# Patient Record
Sex: MALE | Race: WHITE | NOT HISPANIC OR LATINO
[De-identification: names, ages, dates, MRNs, and addresses within clinical notes are randomized per-mention and may not be internally consistent; named-entity substitution may affect disease eponyms.]

---

## 2020-01-20 ENCOUNTER — APPOINTMENT (OUTPATIENT)
Dept: PULMONOLOGY | Facility: CLINIC | Age: 60
End: 2020-01-20
Payer: COMMERCIAL

## 2020-01-20 VITALS
SYSTOLIC BLOOD PRESSURE: 121 MMHG | BODY MASS INDEX: 25.27 KG/M2 | WEIGHT: 161 LBS | HEIGHT: 67 IN | DIASTOLIC BLOOD PRESSURE: 80 MMHG | RESPIRATION RATE: 15 BRPM | HEART RATE: 89 BPM | OXYGEN SATURATION: 98 %

## 2020-01-20 DIAGNOSIS — E78.5 HYPERLIPIDEMIA, UNSPECIFIED: ICD-10-CM

## 2020-01-20 DIAGNOSIS — F17.200 NICOTINE DEPENDENCE, UNSPECIFIED, UNCOMPLICATED: ICD-10-CM

## 2020-01-20 DIAGNOSIS — Z78.9 OTHER SPECIFIED HEALTH STATUS: ICD-10-CM

## 2020-01-20 PROBLEM — Z00.00 ENCOUNTER FOR PREVENTIVE HEALTH EXAMINATION: Status: ACTIVE | Noted: 2020-01-20

## 2020-01-20 PROCEDURE — 94060 EVALUATION OF WHEEZING: CPT

## 2020-01-20 PROCEDURE — 99204 OFFICE O/P NEW MOD 45 MIN: CPT | Mod: 25

## 2020-01-20 PROCEDURE — 99406 BEHAV CHNG SMOKING 3-10 MIN: CPT

## 2020-01-20 PROCEDURE — 94664 DEMO&/EVAL PT USE INHALER: CPT | Mod: 59

## 2020-01-20 PROCEDURE — 94750: CPT

## 2020-01-20 PROCEDURE — 94726 PLETHYSMOGRAPHY LUNG VOLUMES: CPT

## 2020-01-20 PROCEDURE — 94729 DIFFUSING CAPACITY: CPT

## 2020-01-20 NOTE — PHYSICAL EXAM
[General Appearance - Well Developed] : well developed [Normal Oropharynx] : normal oropharynx [Normal Conjunctiva] : the conjunctiva exhibited no abnormalities [General Appearance - Well Nourished] : well nourished [Jugular Venous Distention Increased] : there was no jugular-venous distention [Heart Sounds] : normal S1 and S2 [Murmurs] : no murmurs present [Edema] : no peripheral edema present [Auscultation Breath Sounds / Voice Sounds] : lungs were clear to auscultation bilaterally [Lungs Percussion] : the lungs were normal to percussion [Abdomen Soft] : soft [Abdomen Tenderness] : non-tender [] : no hepato-splenomegaly [Abdomen Mass (___ Cm)] : no abdominal mass palpated [Abnormal Walk] : normal gait [Gait - Sufficient For Exercise Testing] : the gait was sufficient for exercise testing [Nail Clubbing] : no clubbing of the fingernails [Cyanosis, Localized] : no localized cyanosis

## 2020-01-21 NOTE — ASSESSMENT
[FreeTextEntry1] : Course abx then PET imaging. \par Decision on bx vs. resection vs. observation based on above. \par Trial Chantix\par F/U post PET imaging\par 5-7 minute discussion with patient about smoking cessation was initiated with patient showing interest in attempting to quit smoking. Problems with risks of continued tobacco smoking including respiratory, cardiovascular, and oncological problems were discussed. Advice on smoking cessation was reiterated including methods of quitting, setting a date to quit, and different medicines and support systems available for smoking cessation was discussed. \par \par \par

## 2020-01-21 NOTE — CONSULT LETTER
[Dear  ___] : Dear ~ELIZABETH, [Consult Letter:] : I had the pleasure of evaluating your patient, [unfilled]. [Please see my note below.] : Please see my note below. [Consult Closing:] : Thank you very much for allowing me to participate in the care of this patient.  If you have any questions, please do not hesitate to contact me. [Sincerely,] : Sincerely, [FreeTextEntry2] : Desean Busch MD [FreeTextEntry3] : Gerardo Saab MD FCCP\par

## 2020-01-21 NOTE — HISTORY OF PRESENT ILLNESS
[FreeTextEntry1] : CHERELLE SILVEIRA is a 59 year old  M referred for pulmonary evaluation for abnormal CT\par \par Went to PMD sent for CT Lung cancer screening. \par \par To see cardio\par \par Mild HILL. \par \par Past pulmonary history. 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 N\par Recent travel  5/19 [facility].\par Pets N\par \par

## 2020-01-21 NOTE — PROCEDURE
[FreeTextEntry1] : 01/20/2020\par Pulmonary function testing\par Normal Flow Rates There is no significant bronchodilator response. There is elevation in the RV/TLC ratio indicative of possible air trapping. There is a mild diffusion impairment. Airway resistance is normal. \par \par CT reviewed. 1/13/20

## 2020-01-21 NOTE — DISCUSSION/SUMMARY
[FreeTextEntry1] : LLL lesion of concern for possible neoplastic process. Lesion solid, irregular. ? pleural tag.   Infectious inflammatory cannot be excluded. \par COPD\par RUL abnormality likely inflammatory.

## 2020-02-05 ENCOUNTER — APPOINTMENT (OUTPATIENT)
Dept: PULMONOLOGY | Facility: CLINIC | Age: 60
End: 2020-02-05
Payer: COMMERCIAL

## 2020-02-05 VITALS
DIASTOLIC BLOOD PRESSURE: 80 MMHG | RESPIRATION RATE: 14 BRPM | HEART RATE: 83 BPM | HEIGHT: 67 IN | OXYGEN SATURATION: 100 % | SYSTOLIC BLOOD PRESSURE: 120 MMHG

## 2020-02-05 DIAGNOSIS — I25.10 ATHEROSCLEROTIC HEART DISEASE OF NATIVE CORONARY ARTERY W/OUT ANGINA PECTORIS: ICD-10-CM

## 2020-02-05 PROCEDURE — 99214 OFFICE O/P EST MOD 30 MIN: CPT

## 2020-02-06 PROBLEM — I25.10 CORONARY ATHEROSCLEROSIS: Status: ACTIVE | Noted: 2020-02-06

## 2020-02-06 NOTE — HISTORY OF PRESENT ILLNESS
[TextBox_4] : Feeling well\par Seeing cardio\par Doing well with D/C smoking\par No significant cough, wheezing, chest pain or SOB.\par  [FreeTextEntry1] : CHERELLE SILVEIRA is a 59 year old  M referred for pulmonary evaluation for abnormal CT\par \par Went to PMD sent for CT Lung cancer screening. \par \par To see cardio\par \par Mild HILL. \par \par Past pulmonary history. 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 N\par Recent travel  5/19 [facility].\par Pets N\par \par

## 2020-02-06 NOTE — PROCEDURE
[FreeTextEntry1] : 01/20/2020\par Pulmonary function testing\par Normal Flow Rates There is no significant bronchodilator response. There is elevation in the RV/TLC ratio indicative of possible air trapping. There is a mild diffusion impairment. Airway resistance is normal. \par \par CT reviewed. 1/13/20\par \par PET reviewed 2/3/20

## 2020-02-06 NOTE — CONSULT LETTER
[Dear  ___] : Dear ~ELIZABETH, [Please see my note below.] : Please see my note below. [Consult Letter:] : I had the pleasure of evaluating your patient, [unfilled]. [Consult Closing:] : Thank you very much for allowing me to participate in the care of this patient.  If you have any questions, please do not hesitate to contact me. [Sincerely,] : Sincerely, [FreeTextEntry2] : Desean Busch MD [FreeTextEntry3] : Gerardo Saab MD FCCP\par

## 2020-02-06 NOTE — ASSESSMENT
[FreeTextEntry1] : Due to the recent radiographic change I think it is in his best interest to repeat a CAT scan of the chest in 6-8 week to reassess both the right lower lobe and new left upper lobe nodule prior to making a decision regarding need for resection or biopsy of the left lower lobe lesion.\par \par Patient is not a lot I have held off on further antibiotic therapy at this time however should he develop chest symptoms we'll give a course of antibiotic.\par \par I had a lengthy discussion with patient and wife regarding the radiographic findings as well as recommendations

## 2020-02-06 NOTE — DISCUSSION/SUMMARY
[FreeTextEntry1] : LLL lesion remains of concern for possible neoplastic process. Lesion solid, irregular. ? pleural tag.   \par COPD  SUV 1.9\par New GAUTAM nodule not present on recent CT\par RUL abnormality likely inflammatory. \par RLL infiltrate significantly hypermetabolic and increased in size likely infectious inflammatory in origin.'\par Degree of growth in short interval would be very atypical for neoplastic process.

## 2020-03-09 ENCOUNTER — OUTPATIENT (OUTPATIENT)
Dept: OUTPATIENT SERVICES | Facility: HOSPITAL | Age: 60
LOS: 1 days | Discharge: HOME | End: 2020-03-09
Payer: COMMERCIAL

## 2020-03-09 DIAGNOSIS — D68.0 VON WILLEBRAND DISEASE: ICD-10-CM

## 2020-03-10 ENCOUNTER — RESULT REVIEW (OUTPATIENT)
Age: 60
End: 2020-03-10

## 2020-03-10 ENCOUNTER — APPOINTMENT (OUTPATIENT)
Dept: HEMATOLOGY ONCOLOGY | Facility: CLINIC | Age: 60
End: 2020-03-10
Payer: COMMERCIAL

## 2020-03-10 VITALS
RESPIRATION RATE: 16 BRPM | WEIGHT: 163.34 LBS | SYSTOLIC BLOOD PRESSURE: 122 MMHG | DIASTOLIC BLOOD PRESSURE: 77 MMHG | TEMPERATURE: 98.4 F | OXYGEN SATURATION: 99 % | HEART RATE: 83 BPM | BODY MASS INDEX: 25.04 KG/M2 | HEIGHT: 67.72 IN

## 2020-03-10 DIAGNOSIS — Z80.8 FAMILY HISTORY OF MALIGNANT NEOPLASM OF OTHER ORGANS OR SYSTEMS: ICD-10-CM

## 2020-03-10 DIAGNOSIS — Z78.9 OTHER SPECIFIED HEALTH STATUS: ICD-10-CM

## 2020-03-10 LAB
APTT BLD: 36.4 SEC
BASOPHILS # BLD AUTO: 0.03 K/UL — SIGNIFICANT CHANGE UP (ref 0–0.2)
BASOPHILS NFR BLD AUTO: 0.6 % — SIGNIFICANT CHANGE UP (ref 0–2)
EOSINOPHIL # BLD AUTO: 0.22 K/UL — SIGNIFICANT CHANGE UP (ref 0–0.5)
EOSINOPHIL NFR BLD AUTO: 4.5 % — SIGNIFICANT CHANGE UP (ref 0–6)
HCT VFR BLD CALC: 43 % — SIGNIFICANT CHANGE UP (ref 39–50)
HGB BLD-MCNC: 14.9 G/DL — SIGNIFICANT CHANGE UP (ref 13–17)
IMM GRANULOCYTES NFR BLD AUTO: 0.4 % — SIGNIFICANT CHANGE UP (ref 0–1.5)
LYMPHOCYTES # BLD AUTO: 1.6 K/UL — SIGNIFICANT CHANGE UP (ref 1–3.3)
LYMPHOCYTES # BLD AUTO: 32.9 % — SIGNIFICANT CHANGE UP (ref 13–44)
MCHC RBC-ENTMCNC: 32.3 PG — SIGNIFICANT CHANGE UP (ref 27–34)
MCHC RBC-ENTMCNC: 34.7 GM/DL — SIGNIFICANT CHANGE UP (ref 32–36)
MCV RBC AUTO: 93.1 FL — SIGNIFICANT CHANGE UP (ref 80–100)
MONOCYTES # BLD AUTO: 0.53 K/UL — SIGNIFICANT CHANGE UP (ref 0–0.9)
MONOCYTES NFR BLD AUTO: 10.9 % — SIGNIFICANT CHANGE UP (ref 2–14)
NEUTROPHILS # BLD AUTO: 2.47 K/UL — SIGNIFICANT CHANGE UP (ref 1.8–7.4)
NEUTROPHILS NFR BLD AUTO: 50.7 % — SIGNIFICANT CHANGE UP (ref 43–77)
NRBC # BLD: 0 /100 WBCS — SIGNIFICANT CHANGE UP (ref 0–0)
PLATELET # BLD AUTO: 235 K/UL — SIGNIFICANT CHANGE UP (ref 150–400)
RBC # BLD: 4.62 M/UL — SIGNIFICANT CHANGE UP (ref 4.2–5.8)
RBC # FLD: 12.4 % — SIGNIFICANT CHANGE UP (ref 10.3–14.5)
WBC # BLD: 4.87 K/UL — SIGNIFICANT CHANGE UP (ref 3.8–10.5)
WBC # FLD AUTO: 4.87 K/UL — SIGNIFICANT CHANGE UP (ref 3.8–10.5)

## 2020-03-10 PROCEDURE — 99205 OFFICE O/P NEW HI 60 MIN: CPT

## 2020-03-10 RX ORDER — AZITHROMYCIN 250 MG/1
250 TABLET, FILM COATED ORAL
Qty: 10 | Refills: 0 | Status: DISCONTINUED | COMMUNITY
Start: 2020-01-20 | End: 2020-03-10

## 2020-03-10 RX ORDER — AMOXICILLIN AND CLAVULANATE POTASSIUM 875; 125 MG/1; MG/1
875-125 TABLET, COATED ORAL
Qty: 20 | Refills: 0 | Status: DISCONTINUED | COMMUNITY
Start: 2020-02-21 | End: 2020-03-10

## 2020-03-10 RX ORDER — VARENICLINE TARTRATE 0.5 (11)-1
0.5 MG X 11 & KIT ORAL
Qty: 1 | Refills: 0 | Status: DISCONTINUED | COMMUNITY
Start: 2020-01-20 | End: 2020-03-10

## 2020-03-10 RX ORDER — ATORVASTATIN CALCIUM 20 MG/1
20 TABLET, FILM COATED ORAL
Refills: 0 | Status: ACTIVE | COMMUNITY
Start: 2020-01-09

## 2020-03-10 NOTE — CONSULT LETTER
[Dear  ___] : Dear  [unfilled], [Consult Letter:] : I had the pleasure of evaluating your patient, [unfilled]. [Please see my note below.] : Please see my note below. [Consult Closing:] : Thank you very much for allowing me to participate in the care of this patient.  If you have any questions, please do not hesitate to contact me. [Sincerely,] : Sincerely, [DrClaudia  ___] : Dr. ORTIZ [DrClaudia ___] : Dr. ORTIZ
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
## 2020-03-10 NOTE — ASSESSMENT
[FreeTextEntry1] : 59 yo M with FHx von Willebrand type 1 disease, here for hematological clearance prior to lung wedge resection surgery. No hx of bleeding\par \par -will repeat von Willebrand antigen/activity/factor VIII level and aPTT today. Patient has type O blood, which is associated with a lower baseline level of von Willebrand, which can play a role in this patient, flakita. no bleeding\par \par -plan to do DDAVP trial to see if pt responds -scheduled on 3/13/20, will draw CBC, factor VIII, von Willebrand Ab/Activity and aPTT prior to infusion of DDAVP 0.3mcg/kg (to max 20mcg), then repeat testing at 1 hr and 4 hours post administration, to ensure patient responds. If he is a responder, then DDAVP is a possibility prior to surgery.  Will d/w thoracic surgeon the extent of surgery -if major surgery, then he may benefit from vWF concentrates to keep levels of vWF to 100%. Usual dosing is Humate-P 50 ristocetic cofactor units/kg (=3700 units) followed by 20-40 RCU/kg (=3929-3486 Unit) q12 hours for 5-7 days, as clinically indicated\par \par -d/w pt and wife risks/benefits of DDAVP and written consent was signed today\par \par -follow up after surgery; will d/w pt and family results and recommendations after testing

## 2020-03-10 NOTE — RESULTS/DATA
[FreeTextEntry1] : Today's CBC (ON 3/10/20) wbc 4.9 hb 14.9 plt 235\par \par The previous medical records were reviewed\par On 2/1/20 factor XI activity 62, factor VIII 26%, von WF 52%, activity 39%, multimers normal, blood type O+\par

## 2020-03-10 NOTE — PHYSICAL EXAM
Abdominal Pain, N/V/D [Fully active, able to carry on all pre-disease performance without restriction] : Status 0 - Fully active, able to carry on all pre-disease performance without restriction [Normal] : affect appropriate

## 2020-03-10 NOTE — REVIEW OF SYSTEMS
[Negative] : Allergic/Immunologic [Fever] : no fever [Chills] : no chills [Night Sweats] : no night sweats [Fatigue] : no fatigue [Recent Change In Weight] : ~T no recent weight change [Dysphagia] : no dysphagia [Loss of Hearing] : no loss of hearing [Nosebleeds] : no nosebleeds [Odynophagia] : no odynophagia [Mucosal Pain] : no mucosal pain [Chest Pain] : no chest pain [Shortness Of Breath] : no shortness of breath [Wheezing] : no wheezing [Cough] : no cough [SOB on Exertion] : no shortness of breath during exertion [Abdominal Pain] : no abdominal pain [Dysuria] : no dysuria [Incontinence] : no incontinence [Joint Pain] : no joint pain [Proptosis] : no proptosis [Hot Flashes] : no hot flashes [Muscle Weakness] : no muscle weakness [Easy Bleeding] : no tendency for easy bleeding [Easy Bruising] : no tendency for easy bruising [Swollen Glands] : no swollen glands

## 2020-03-11 LAB
FACT VIII ACT/NOR PPP: 72 %
VWF AG PPP IA-ACNC: 110 %
VWF:RCO ACT/NOR PPP PL AGG: 124 %

## 2020-03-13 ENCOUNTER — LABORATORY RESULT (OUTPATIENT)
Age: 60
End: 2020-03-13

## 2020-03-13 ENCOUNTER — RESULT REVIEW (OUTPATIENT)
Age: 60
End: 2020-03-13

## 2020-03-13 ENCOUNTER — APPOINTMENT (OUTPATIENT)
Dept: INFUSION THERAPY | Facility: HOSPITAL | Age: 60
End: 2020-03-13

## 2020-03-13 LAB
BASOPHILS # BLD AUTO: 0.05 K/UL — SIGNIFICANT CHANGE UP (ref 0–0.2)
BASOPHILS NFR BLD AUTO: 0.8 % — SIGNIFICANT CHANGE UP (ref 0–2)
EOSINOPHIL # BLD AUTO: 0.28 K/UL — SIGNIFICANT CHANGE UP (ref 0–0.5)
EOSINOPHIL NFR BLD AUTO: 4.4 % — SIGNIFICANT CHANGE UP (ref 0–6)
HCT VFR BLD CALC: 41.4 % — SIGNIFICANT CHANGE UP (ref 39–50)
HGB BLD-MCNC: 14.5 G/DL — SIGNIFICANT CHANGE UP (ref 13–17)
IMM GRANULOCYTES NFR BLD AUTO: 0.8 % — SIGNIFICANT CHANGE UP (ref 0–1.5)
LYMPHOCYTES # BLD AUTO: 2.01 K/UL — SIGNIFICANT CHANGE UP (ref 1–3.3)
LYMPHOCYTES # BLD AUTO: 31.6 % — SIGNIFICANT CHANGE UP (ref 13–44)
MCHC RBC-ENTMCNC: 32.6 PG — SIGNIFICANT CHANGE UP (ref 27–34)
MCHC RBC-ENTMCNC: 35 GM/DL — SIGNIFICANT CHANGE UP (ref 32–36)
MCV RBC AUTO: 93 FL — SIGNIFICANT CHANGE UP (ref 80–100)
MONOCYTES # BLD AUTO: 0.54 K/UL — SIGNIFICANT CHANGE UP (ref 0–0.9)
MONOCYTES NFR BLD AUTO: 8.5 % — SIGNIFICANT CHANGE UP (ref 2–14)
NEUTROPHILS # BLD AUTO: 3.43 K/UL — SIGNIFICANT CHANGE UP (ref 1.8–7.4)
NEUTROPHILS NFR BLD AUTO: 53.9 % — SIGNIFICANT CHANGE UP (ref 43–77)
NRBC # BLD: 0 /100 WBCS — SIGNIFICANT CHANGE UP (ref 0–0)
PLATELET # BLD AUTO: 241 K/UL — SIGNIFICANT CHANGE UP (ref 150–400)
RBC # BLD: 4.45 M/UL — SIGNIFICANT CHANGE UP (ref 4.2–5.8)
RBC # FLD: 12.1 % — SIGNIFICANT CHANGE UP (ref 10.3–14.5)
WBC # BLD: 6.36 K/UL — SIGNIFICANT CHANGE UP (ref 3.8–10.5)
WBC # FLD AUTO: 6.36 K/UL — SIGNIFICANT CHANGE UP (ref 3.8–10.5)

## 2020-03-13 PROCEDURE — 93010 ELECTROCARDIOGRAM REPORT: CPT

## 2020-03-16 ENCOUNTER — APPOINTMENT (OUTPATIENT)
Dept: PULMONOLOGY | Facility: CLINIC | Age: 60
End: 2020-03-16

## 2020-06-08 ENCOUNTER — APPOINTMENT (OUTPATIENT)
Dept: HEMATOLOGY ONCOLOGY | Facility: CLINIC | Age: 60
End: 2020-06-08

## 2020-08-11 DIAGNOSIS — R91.8 OTHER NONSPECIFIC ABNORMAL FINDING OF LUNG FIELD: ICD-10-CM

## 2021-02-22 ENCOUNTER — NON-APPOINTMENT (OUTPATIENT)
Age: 61
End: 2021-02-22

## 2021-09-13 DIAGNOSIS — Z20.822 CONTACT WITH AND (SUSPECTED) EXPOSURE TO COVID-19: ICD-10-CM

## 2021-09-13 PROBLEM — D68.0 VON WILLEBRAND DISEASE: Chronic | Status: ACTIVE | Noted: 2020-03-12

## 2021-09-13 PROBLEM — J44.9 CHRONIC OBSTRUCTIVE PULMONARY DISEASE, UNSPECIFIED: Chronic | Status: ACTIVE | Noted: 2020-03-12

## 2021-09-13 PROBLEM — I25.10 ATHEROSCLEROTIC HEART DISEASE OF NATIVE CORONARY ARTERY WITHOUT ANGINA PECTORIS: Chronic | Status: ACTIVE | Noted: 2020-03-12

## 2021-09-27 ENCOUNTER — APPOINTMENT (OUTPATIENT)
Dept: OTOLARYNGOLOGY | Facility: CLINIC | Age: 61
End: 2021-09-27
Payer: COMMERCIAL

## 2021-09-27 VITALS
WEIGHT: 187 LBS | BODY MASS INDEX: 29.35 KG/M2 | DIASTOLIC BLOOD PRESSURE: 74 MMHG | HEIGHT: 67 IN | SYSTOLIC BLOOD PRESSURE: 111 MMHG | HEART RATE: 80 BPM

## 2021-09-27 DIAGNOSIS — H93.13 TINNITUS, BILATERAL: ICD-10-CM

## 2021-09-27 DIAGNOSIS — H83.3X3 NOISE EFFECTS ON INNER EAR, BILATERAL: ICD-10-CM

## 2021-09-27 DIAGNOSIS — H91.90 UNSPECIFIED HEARING LOSS, UNSPECIFIED EAR: ICD-10-CM

## 2021-09-27 DIAGNOSIS — H91.93 UNSPECIFIED HEARING LOSS, BILATERAL: ICD-10-CM

## 2021-09-27 DIAGNOSIS — J34.2 DEVIATED NASAL SEPTUM: ICD-10-CM

## 2021-09-27 PROCEDURE — 99243 OFF/OP CNSLTJ NEW/EST LOW 30: CPT

## 2021-09-27 PROCEDURE — 92557 COMPREHENSIVE HEARING TEST: CPT

## 2021-09-27 PROCEDURE — 92588 EVOKED AUDITORY TST COMPLETE: CPT

## 2021-09-27 PROCEDURE — 92570 ACOUSTIC IMMITANCE TESTING: CPT
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
## 2021-09-27 NOTE — REVIEW OF SYSTEMS
[Hearing Loss] : hearing loss [Ear Noises] : ear noises [Throat Clearing] : throat clearing [Heartburn] : heartburn [Joint Pain] : joint pain [Negative] : Heme/Lymph [FreeTextEntry9] : muscle aches

## 2021-09-27 NOTE — DATA REVIEWED
[de-identified] : \par - type A tymps AU\par - ETF WNL AD; abnormal AS\par - AD: hearing WNL precipitously sloping to a profound SNHL 250-8000 Hz\par - AS: hearing WNL precipitously sloping to a severe SNHL 250-8000 Hz\par - DPOAEs: present 1-2kHz AU\par 1) ENT F/U 2) Re-eval as per MD 3) HAE pending medical clearance 4) further testing as per MD

## 2021-09-27 NOTE — PHYSICAL EXAM
[Hearing Etienne Test (Tuning Fork On Forehead)] : no lateralization of tone [] : septum deviated to the left [Midline] : trachea located in midline position [Normal] : cranial nerves 2-12 intact [FreeTextEntry1] : sinuses non-tender, deviated septum to the left.  [FreeTextEntry2] : sinuses non tender to percussion

## 2021-10-12 DIAGNOSIS — Z01.812 ENCOUNTER FOR PREPROCEDURAL LABORATORY EXAMINATION: ICD-10-CM

## 2021-11-05 ENCOUNTER — APPOINTMENT (OUTPATIENT)
Dept: PULMONOLOGY | Facility: CLINIC | Age: 61
End: 2021-11-05

## 2021-11-06 ENCOUNTER — APPOINTMENT (OUTPATIENT)
Dept: DISASTER EMERGENCY | Facility: CLINIC | Age: 61
End: 2021-11-06

## 2021-11-06 DIAGNOSIS — Z01.818 ENCOUNTER FOR OTHER PREPROCEDURAL EXAMINATION: ICD-10-CM

## 2021-11-07 LAB — SARS-COV-2 N GENE NPH QL NAA+PROBE: NOT DETECTED

## 2021-11-10 ENCOUNTER — APPOINTMENT (OUTPATIENT)
Dept: PULMONOLOGY | Facility: CLINIC | Age: 61
End: 2021-11-10
Payer: COMMERCIAL

## 2021-11-10 VITALS
RESPIRATION RATE: 14 BRPM | TEMPERATURE: 98.3 F | WEIGHT: 185 LBS | DIASTOLIC BLOOD PRESSURE: 60 MMHG | HEIGHT: 67 IN | OXYGEN SATURATION: 97 % | HEART RATE: 82 BPM | BODY MASS INDEX: 29.03 KG/M2 | SYSTOLIC BLOOD PRESSURE: 97 MMHG

## 2021-11-10 PROCEDURE — 94729 DIFFUSING CAPACITY: CPT

## 2021-11-10 PROCEDURE — 94727 GAS DIL/WSHOT DETER LNG VOL: CPT

## 2021-11-10 PROCEDURE — 88738 HGB QUANT TRANSCUTANEOUS: CPT

## 2021-11-10 PROCEDURE — 94010 BREATHING CAPACITY TEST: CPT

## 2021-11-15 ENCOUNTER — APPOINTMENT (OUTPATIENT)
Dept: PULMONOLOGY | Facility: CLINIC | Age: 61
End: 2021-11-15
Payer: COMMERCIAL

## 2021-11-15 DIAGNOSIS — R91.8 OTHER NONSPECIFIC ABNORMAL FINDING OF LUNG FIELD: ICD-10-CM

## 2021-11-15 PROCEDURE — 99213 OFFICE O/P EST LOW 20 MIN: CPT | Mod: 95

## 2021-11-16 NOTE — PROCEDURE
[FreeTextEntry1] : Recent pulmonary function testing and CAT scan reviewed and reviewed with patient.

## 2021-11-16 NOTE — DISCUSSION/SUMMARY
[FreeTextEntry1] : Mild COPD.  Function stable to mildly improved.\par Pulmonary Nodules.  Stable.\par Ex-smoker.

## 2021-11-16 NOTE — HISTORY OF PRESENT ILLNESS
[Former] : former [Home] : at home, [unfilled] , at the time of the visit. [Medical Office: ([facility])___] : at the medical office located in  [TextBox_4] : This visit was provided via telehealth using real-time 2-way audio visual technology. The patient, CHERELLE SILVEIRA , was located at home, [address] [place] [address]  at the time of the visit.\par The provider, Gerardo Saab, was located at office [address] [place] [address] at the time of the visit. \par \par  The patient, Mr. CHERELLE SILVEIRA  and Physician Gerardo Lozoya participated in the telehealth encounter.\par \par Verbal consent obtained by  from patient\par \par Feeling well. \par Respiratory status improved since discontinued smoking.\par Denies cough wheezing chest congestion mucus production chest pain or shortness of breath.\par  [TextBox_11] : 1-2 [TextBox_13] : 40 [YearQuit] : 1/20

## 2021-12-22 DIAGNOSIS — R05.9 COUGH, UNSPECIFIED: ICD-10-CM

## 2021-12-23 ENCOUNTER — APPOINTMENT (OUTPATIENT)
Dept: PULMONOLOGY | Facility: CLINIC | Age: 61
End: 2021-12-23
Payer: COMMERCIAL

## 2021-12-23 DIAGNOSIS — U07.1 COVID-19: ICD-10-CM

## 2021-12-23 PROCEDURE — 99214 OFFICE O/P EST MOD 30 MIN: CPT | Mod: 95

## 2021-12-23 NOTE — DISCUSSION/SUMMARY
[FreeTextEntry1] : Covid virus infection.\par Mild COPD. \par Other comorbidity von Willebrand's disease and hyperlipidemia.\par Pulmonary Nodules.  Stable.\par Ex-smoker.

## 2021-12-23 NOTE — HISTORY OF PRESENT ILLNESS
[Home] : at home, [unfilled] , at the time of the visit. [Medical Office: ([facility])___] : at the medical office located in  [TextBox_4] : This visit was provided via telehealth using real-time 2-way audio visual technology. The patient, CHERELLE SILVEIRA , was located at home, [address] [place] [address]\[place] [address]  at the time of the visit.\par The provider, Gerardo Saab, was located at office [address] [place] [address] at the time of the visit. \par \par  The patient, Mr. CHERELLE SILVEIRA  and Physician Gerardo Lozoya participated in the telehealth encounter.\par \par Verbal consent obtained by  from patient\par \par BecaME ILL 3 DAYS AGO\par Tested posive\par Sinus congestion then chest congestion.\par Then Flu like symptoms.\par Temps until yesterday. Temp 101.8\par Today no chills.\par Some cough today.\par Taking cough medication\par Some hoarseness.\par Mild SOB\par Sats 95 and pulse 87\par Vaccinated x 2 no  Booster.\par \par

## 2021-12-23 NOTE — ASSESSMENT
[FreeTextEntry1] : Rest home and isolate\par Follow oxygen saturations.  Parameters discussed.\par Baby ASA\par Pepcid\par Vitamin D\par Tylenol\par CROWN protocol\par Labs drawn in office today\par Post observation.  Patient instructed to call if he develops increased shortness of breath or decrease in oxygen saturation.\par \par

## 2021-12-27 LAB
ALBUMIN SERPL ELPH-MCNC: 4.5 G/DL
ALP BLD-CCNC: 113 U/L
ALT SERPL-CCNC: 52 U/L
ANION GAP SERPL CALC-SCNC: 10 MMOL/L
AST SERPL-CCNC: 24 U/L
BASOPHILS # BLD AUTO: 0.02 K/UL
BASOPHILS NFR BLD AUTO: 0.3 %
BILIRUB SERPL-MCNC: 0.3 MG/DL
BUN SERPL-MCNC: 19 MG/DL
CALCIUM SERPL-MCNC: 9.3 MG/DL
CHLORIDE SERPL-SCNC: 99 MMOL/L
CO2 SERPL-SCNC: 28 MMOL/L
CREAT SERPL-MCNC: 1.18 MG/DL
CRP SERPL-MCNC: 10 MG/L
DEPRECATED D DIMER PPP IA-ACNC: <150 NG/ML DDU
EOSINOPHIL # BLD AUTO: 0.18 K/UL
EOSINOPHIL NFR BLD AUTO: 3.1 %
FERRITIN SERPL-MCNC: 218 NG/ML
GLUCOSE SERPL-MCNC: 97 MG/DL
HCT VFR BLD CALC: 44.7 %
HGB BLD-MCNC: 15.2 G/DL
IMM GRANULOCYTES NFR BLD AUTO: 0.5 %
LYMPHOCYTES # BLD AUTO: 2.53 K/UL
LYMPHOCYTES NFR BLD AUTO: 44 %
MAN DIFF?: NORMAL
MCHC RBC-ENTMCNC: 32.5 PG
MCHC RBC-ENTMCNC: 34 GM/DL
MCV RBC AUTO: 95.7 FL
MONOCYTES # BLD AUTO: 0.67 K/UL
MONOCYTES NFR BLD AUTO: 11.7 %
NEUTROPHILS # BLD AUTO: 2.32 K/UL
NEUTROPHILS NFR BLD AUTO: 40.4 %
PLATELET # BLD AUTO: 203 K/UL
POTASSIUM SERPL-SCNC: 4.8 MMOL/L
PROCALCITONIN SERPL-MCNC: 0.09 NG/ML
PROT SERPL-MCNC: 6.7 G/DL
RBC # BLD: 4.67 M/UL
RBC # FLD: 12 %
SODIUM SERPL-SCNC: 137 MMOL/L
WBC # FLD AUTO: 5.75 K/UL

## 2022-03-14 ENCOUNTER — APPOINTMENT (OUTPATIENT)
Dept: OTOLARYNGOLOGY | Facility: CLINIC | Age: 62
End: 2022-03-14

## 2022-07-28 ENCOUNTER — TRANSCRIPTION ENCOUNTER (OUTPATIENT)
Age: 62
End: 2022-07-28

## 2022-08-10 ENCOUNTER — APPOINTMENT (OUTPATIENT)
Dept: HEMATOLOGY ONCOLOGY | Facility: CLINIC | Age: 62
End: 2022-08-10

## 2022-08-16 ENCOUNTER — APPOINTMENT (OUTPATIENT)
Dept: PULMONOLOGY | Facility: CLINIC | Age: 62
End: 2022-08-16

## 2022-08-16 LAB — POCT - HEMOGLOBIN (HGB), QUANTITATIVE, TRANSCUTANEOUS: 14.1

## 2022-08-16 PROCEDURE — ZZZZZ: CPT

## 2022-08-16 PROCEDURE — 94729 DIFFUSING CAPACITY: CPT

## 2022-08-16 PROCEDURE — 88738 HGB QUANT TRANSCUTANEOUS: CPT

## 2022-08-16 PROCEDURE — 99213 OFFICE O/P EST LOW 20 MIN: CPT | Mod: 25

## 2022-08-16 PROCEDURE — G0296 VISIT TO DETERM LDCT ELIG: CPT

## 2022-08-16 PROCEDURE — 94010 BREATHING CAPACITY TEST: CPT

## 2022-08-16 PROCEDURE — 94727 GAS DIL/WSHOT DETER LNG VOL: CPT

## 2022-08-16 RX ORDER — VARENICLINE TARTRATE 1 MG/1
1 TABLET, FILM COATED ORAL
Qty: 1 | Refills: 3 | Status: DISCONTINUED | COMMUNITY
Start: 2020-01-20 | End: 2022-08-16

## 2022-08-16 RX ORDER — BENZONATATE 200 MG/1
200 CAPSULE ORAL 3 TIMES DAILY
Qty: 60 | Refills: 2 | Status: DISCONTINUED | COMMUNITY
Start: 2021-12-22 | End: 2022-08-16

## 2022-08-17 NOTE — HISTORY OF PRESENT ILLNESS
[Former] : former [TextBox_4] : Here for f/u\par  due for screening ct chest\par  [TextBox_11] : 1 1/2-2 [TextBox_13] : 40 [YearQuit] : 2020

## 2022-08-17 NOTE — DISCUSSION/SUMMARY
[FreeTextEntry1] : Mild COPD.  clinically and functionally stable.\par Pulmonary Nodules.  \par Ex-smoker.

## 2022-08-17 NOTE — PROCEDURE
[FreeTextEntry1] : 08/16/2022\par Pulmonary function testing\par These data demonstrate a very mild obstructive ventilatory deficit. Normal Lung Volumes. There is a mild diffusion impairment. \par PFT attached relatively stable function.\par \par CT of September 2021 noted and reviewed.

## 2022-08-18 ENCOUNTER — OUTPATIENT (OUTPATIENT)
Dept: OUTPATIENT SERVICES | Facility: HOSPITAL | Age: 62
LOS: 1 days | Discharge: HOME | End: 2022-08-18

## 2022-08-18 DIAGNOSIS — D68.0 VON WILLEBRAND DISEASE: ICD-10-CM

## 2022-08-22 ENCOUNTER — APPOINTMENT (OUTPATIENT)
Age: 62
End: 2022-08-22

## 2022-08-22 DIAGNOSIS — D68.0 VON WILLEBRAND'S DISEASE: ICD-10-CM

## 2022-09-29 ENCOUNTER — APPOINTMENT (OUTPATIENT)
Dept: CT IMAGING | Facility: CLINIC | Age: 62
End: 2022-09-29

## 2022-09-29 ENCOUNTER — NON-APPOINTMENT (OUTPATIENT)
Age: 62
End: 2022-09-29

## 2022-09-29 VITALS — HEIGHT: 67 IN | WEIGHT: 185 LBS | BODY MASS INDEX: 29.03 KG/M2

## 2022-09-29 PROCEDURE — 71271 CT THORAX LUNG CANCER SCR C-: CPT

## 2022-09-29 NOTE — HISTORY OF PRESENT ILLNESS
[Former] : Former [TextBox_13] : Referred by Dr. Gerardo Saab.\par \par Mr. SILVEIRA is a 62 year old male with a history of CAD, high cholesterol and COPD.  Reviewed and confirmed that the patient meets screening eligibility criteria:\par \par 62 years old \par \par Smoking Status: Former smoker  \par \par Number of pack(s) per day: 1.5-2\par Number of years smoked: 40\par Number of pack years smokin\par \par Number of years since quitting smokin\par Quit year: \par \par No symptoms of lung cancer, including new cough, change in cough, hemoptysis, and unintentional weight loss.\par \par No personal history of lung cancer.  No lung cancer in a first degree relative.  No history of occupational exposures. [YearQuit] : 2020 [PacksperDay] : 1.75 [N_Years] : 40 [PacksperYear] : 70

## 2023-08-15 ENCOUNTER — APPOINTMENT (OUTPATIENT)
Dept: PULMONOLOGY | Facility: CLINIC | Age: 63
End: 2023-08-15
Payer: COMMERCIAL

## 2023-08-15 VITALS
DIASTOLIC BLOOD PRESSURE: 70 MMHG | SYSTOLIC BLOOD PRESSURE: 121 MMHG | RESPIRATION RATE: 14 BRPM | HEIGHT: 67 IN | BODY MASS INDEX: 31.39 KG/M2 | HEART RATE: 86 BPM | OXYGEN SATURATION: 99 % | WEIGHT: 200 LBS

## 2023-08-15 DIAGNOSIS — J44.9 CHRONIC OBSTRUCTIVE PULMONARY DISEASE, UNSPECIFIED: ICD-10-CM

## 2023-08-15 DIAGNOSIS — R91.8 OTHER NONSPECIFIC ABNORMAL FINDING OF LUNG FIELD: ICD-10-CM

## 2023-08-15 LAB — POCT - HEMOGLOBIN (HGB), QUANTITATIVE, TRANSCUTANEOUS: 13.6

## 2023-08-15 PROCEDURE — 99214 OFFICE O/P EST MOD 30 MIN: CPT | Mod: 25

## 2023-08-15 PROCEDURE — 94727 GAS DIL/WSHOT DETER LNG VOL: CPT

## 2023-08-15 PROCEDURE — 94010 BREATHING CAPACITY TEST: CPT

## 2023-08-15 PROCEDURE — 88738 HGB QUANT TRANSCUTANEOUS: CPT

## 2023-08-15 PROCEDURE — ZZZZZ: CPT

## 2023-08-15 PROCEDURE — 94729 DIFFUSING CAPACITY: CPT

## 2023-08-15 NOTE — HISTORY OF PRESENT ILLNESS
[Former] : former [TextBox_4] : Here for f/u and PFT due for screening ct chest Mild increase in HILL No cough, wheeze or chest congestion.  Had rotator cuff surgery.  Some wt. gain.  Feeling otherwise well.

## 2023-08-15 NOTE — ASSESSMENT
[FreeTextEntry1] : ordered screening CT. We will review and call patient. F/U 1 year or sooner on a PRN basis.  Follow lung function. Weight loss recommended and discussed.   35 minutes spent in evaluation management and review of studies.

## 2023-08-15 NOTE — PROCEDURE
[FreeTextEntry1] : 08/15/2023 Pulmonary function testing Normal Flow Rates There is elevation in the RV/TLC ratio indicative of possible air trapping. There is a mild-mod diffusion impairment.  Mild decrease in diffusion compared to 1 year prior.

## 2023-08-15 NOTE — PHYSICAL EXAM
[No Acute Distress] : no acute distress [Normal Rate/Rhythm] : normal rate/rhythm [Normal S1, S2] : normal s1, s2 [No Resp Distress] : no resp distress [Clear to Auscultation Bilaterally] : clear to auscultation bilaterally [No Clubbing] : no clubbing [No Focal Deficits] : no focal deficits [Normal Affect] : normal affect [No Abnormalities] : no abnormalities [Benign] : benign [Not Tender] : not tender [No HSM] : no hsm [No Cyanosis] : no cyanosis [No Edema] : no edema

## 2023-08-15 NOTE — DISCUSSION/SUMMARY
[FreeTextEntry1] : Mild COPD.  clinically stable.  Mild decrement in diffusion likely not highly clinically significant.  Could be related to weight gain. Pulmonary Nodules.   Ex-smoker.

## 2023-09-25 ENCOUNTER — NON-APPOINTMENT (OUTPATIENT)
Age: 63
End: 2023-09-25

## 2023-09-25 VITALS — BODY MASS INDEX: 31.39 KG/M2 | HEIGHT: 67 IN | WEIGHT: 200 LBS

## 2023-09-25 DIAGNOSIS — Z87.891 PERSONAL HISTORY OF NICOTINE DEPENDENCE: ICD-10-CM

## 2023-09-26 ENCOUNTER — OUTPATIENT (OUTPATIENT)
Dept: OUTPATIENT SERVICES | Facility: HOSPITAL | Age: 63
LOS: 1 days | End: 2023-09-26
Payer: COMMERCIAL

## 2023-09-26 ENCOUNTER — APPOINTMENT (OUTPATIENT)
Dept: CT IMAGING | Facility: CLINIC | Age: 63
End: 2023-09-26
Payer: COMMERCIAL

## 2023-09-26 DIAGNOSIS — Z87.891 PERSONAL HISTORY OF NICOTINE DEPENDENCE: ICD-10-CM

## 2023-09-26 PROCEDURE — 71271 CT THORAX LUNG CANCER SCR C-: CPT

## 2023-09-26 PROCEDURE — 71271 CT THORAX LUNG CANCER SCR C-: CPT | Mod: 26

## 2023-10-09 ENCOUNTER — NON-APPOINTMENT (OUTPATIENT)
Age: 63
End: 2023-10-09

## 2024-07-22 ENCOUNTER — NON-APPOINTMENT (OUTPATIENT)
Age: 64
End: 2024-07-22

## 2024-09-03 ENCOUNTER — APPOINTMENT (OUTPATIENT)
Dept: PULMONOLOGY | Facility: CLINIC | Age: 64
End: 2024-09-03
Payer: COMMERCIAL

## 2024-09-03 ENCOUNTER — APPOINTMENT (OUTPATIENT)
Dept: PULMONOLOGY | Facility: CLINIC | Age: 64
End: 2024-09-03

## 2024-09-03 VITALS — HEART RATE: 83 BPM | OXYGEN SATURATION: 95 % | DIASTOLIC BLOOD PRESSURE: 72 MMHG | SYSTOLIC BLOOD PRESSURE: 129 MMHG

## 2024-09-03 DIAGNOSIS — J44.9 CHRONIC OBSTRUCTIVE PULMONARY DISEASE, UNSPECIFIED: ICD-10-CM

## 2024-09-03 DIAGNOSIS — R91.8 OTHER NONSPECIFIC ABNORMAL FINDING OF LUNG FIELD: ICD-10-CM

## 2024-09-03 LAB — POCT - HEMOGLOBIN (HGB), QUANTITATIVE, TRANSCUTANEOUS: 14.1

## 2024-09-03 PROCEDURE — ZZZZZ: CPT

## 2024-09-03 PROCEDURE — 88738 HGB QUANT TRANSCUTANEOUS: CPT

## 2024-09-03 PROCEDURE — 94010 BREATHING CAPACITY TEST: CPT

## 2024-09-03 PROCEDURE — 99213 OFFICE O/P EST LOW 20 MIN: CPT | Mod: 25

## 2024-09-03 PROCEDURE — 94729 DIFFUSING CAPACITY: CPT

## 2024-09-03 PROCEDURE — 94727 GAS DIL/WSHOT DETER LNG VOL: CPT

## 2024-09-03 PROCEDURE — G0296 VISIT TO DETERM LDCT ELIG: CPT

## 2024-09-03 NOTE — PROCEDURE
[FreeTextEntry1] : 09/03/2024 Pulmonary function testing Normal Flow Rates There is elevation in the RV/TLC ratio indicative of possible air trapping. There is a mild diffusion impairment.  No significant change in function compared to August 2023.

## 2024-09-03 NOTE — PHYSICAL EXAM
[No Acute Distress] : no acute distress [Normal Rate/Rhythm] : normal rate/rhythm [Normal S1, S2] : normal s1, s2 [No Resp Distress] : no resp distress [Clear to Auscultation Bilaterally] : clear to auscultation bilaterally [No Abnormalities] : no abnormalities [Benign] : benign [Not Tender] : not tender [No HSM] : no hsm [No Clubbing] : no clubbing [No Cyanosis] : no cyanosis [No Edema] : no edema [No Focal Deficits] : no focal deficits [Normal Affect] : normal affect

## 2024-09-03 NOTE — ASSESSMENT
[FreeTextEntry1] : ordered screening CT. task sent We will review and call patient. F/U 1 year or sooner on a PRN basis.     25 minutes spent in evaluation management and review of studies.

## 2024-09-03 NOTE — HISTORY OF PRESENT ILLNESS
[Former] : former [TextBox_4] : Here for f/u and PFT due for screening ct chest seeing cardio and c/o of some chest pain with activity  to f/u with him this week No cough, wheeze or chest congestion.  Some wt. gain.  Feeling otherwise well.

## 2024-09-09 ENCOUNTER — NON-APPOINTMENT (OUTPATIENT)
Age: 64
End: 2024-09-09

## 2024-09-09 VITALS — WEIGHT: 200 LBS | HEIGHT: 67 IN | BODY MASS INDEX: 31.39 KG/M2

## 2024-09-09 DIAGNOSIS — Z87.891 PERSONAL HISTORY OF NICOTINE DEPENDENCE: ICD-10-CM

## 2024-09-09 NOTE — HISTORY OF PRESENT ILLNESS
[Former] : Former [TextBox_13] : Referred by Dr. Gerardo Saab.  Mr. SILVEIRA is a 64 year old male with a history of CAD, high cholesterol and COPD.  Reviewed and confirmed that the patient meets screening eligibility criteria:  64 years old   Smoking Status: Former smoker    Number of pack(s) per day: 1.5-2 Number of years smoked: 40 Number of pack years smokin Quit year:   No symptoms of lung cancer, including new cough, change in cough, hemoptysis, and unintentional weight loss.  No personal history of lung cancer.  No lung cancer in a first degree relative.  No history of occupational exposures. [YearQuit] : 2020 [PacksperDay] : 1.75 [N_Years] : 40 [PacksperYear] : 70

## 2024-09-17 ENCOUNTER — APPOINTMENT (OUTPATIENT)
Dept: CT IMAGING | Facility: CLINIC | Age: 64
End: 2024-09-17
Payer: COMMERCIAL

## 2024-09-17 PROCEDURE — 71271 CT THORAX LUNG CANCER SCR C-: CPT

## 2024-09-23 ENCOUNTER — NON-APPOINTMENT (OUTPATIENT)
Age: 64
End: 2024-09-23

## 2024-09-24 ENCOUNTER — NON-APPOINTMENT (OUTPATIENT)
Age: 64
End: 2024-09-24

## 2024-11-04 RX ORDER — AZITHROMYCIN 250 MG/1
250 TABLET, FILM COATED ORAL
Qty: 1 | Refills: 0 | Status: ACTIVE | COMMUNITY
Start: 2024-11-04 | End: 1900-01-01